# Patient Record
Sex: FEMALE | Race: BLACK OR AFRICAN AMERICAN | NOT HISPANIC OR LATINO | URBAN - NONMETROPOLITAN AREA
[De-identification: names, ages, dates, MRNs, and addresses within clinical notes are randomized per-mention and may not be internally consistent; named-entity substitution may affect disease eponyms.]

---
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
## 2023-03-08 ENCOUNTER — OFFICE VISIT (OUTPATIENT)
Dept: URBAN - NONMETROPOLITAN AREA CLINIC 2 | Facility: CLINIC | Age: 18
End: 2023-03-08

## 2023-03-08 RX ORDER — HYDROCORTISONE ACETATE 25 MG/1
1 SUPPOSITORY SUPPOSITORY RECTAL ONCE A DAY
Qty: 30 | Refills: 1 | Status: ACTIVE | COMMUNITY

## 2023-03-08 RX ORDER — PANTOPRAZOLE SODIUM 40 MG/1
1 TABLET TABLET, DELAYED RELEASE ORAL ONCE A DAY
Qty: 90 TABLET | Refills: 3 | Status: ACTIVE | COMMUNITY
Start: 2022-01-13

## 2023-03-08 RX ORDER — DICYCLOMINE HYDROCHLORIDE 10 MG/1
1 CAPSULES CAPSULE ORAL THREE TIMES A DAY
Qty: 90 CAPSULE | Refills: 3 | Status: ACTIVE | COMMUNITY

## 2023-04-05 ENCOUNTER — OFFICE VISIT (OUTPATIENT)
Dept: URBAN - NONMETROPOLITAN AREA CLINIC 2 | Facility: CLINIC | Age: 18
End: 2023-04-05

## 2024-05-01 ENCOUNTER — LAB OUTSIDE AN ENCOUNTER (OUTPATIENT)
Dept: URBAN - NONMETROPOLITAN AREA CLINIC 2 | Facility: CLINIC | Age: 19
End: 2024-05-01

## 2024-05-01 ENCOUNTER — DASHBOARD ENCOUNTERS (OUTPATIENT)
Age: 19
End: 2024-05-01

## 2024-05-01 ENCOUNTER — OFFICE VISIT (OUTPATIENT)
Dept: URBAN - NONMETROPOLITAN AREA CLINIC 2 | Facility: CLINIC | Age: 19
End: 2024-05-01
Payer: COMMERCIAL

## 2024-05-01 VITALS
HEIGHT: 65 IN | BODY MASS INDEX: 34.99 KG/M2 | SYSTOLIC BLOOD PRESSURE: 134 MMHG | TEMPERATURE: 98 F | HEART RATE: 85 BPM | WEIGHT: 210 LBS | DIASTOLIC BLOOD PRESSURE: 87 MMHG

## 2024-05-01 DIAGNOSIS — K51.211 ULCERATIVE PROCTITIS WITH RECTAL BLEEDING: ICD-10-CM

## 2024-05-01 PROCEDURE — 99214 OFFICE O/P EST MOD 30 MIN: CPT | Performed by: NURSE PRACTITIONER

## 2024-05-01 RX ORDER — PANTOPRAZOLE SODIUM 40 MG/1
1 TABLET TABLET, DELAYED RELEASE ORAL ONCE A DAY
Qty: 90 TABLET | Refills: 3 | Status: ACTIVE | COMMUNITY
Start: 2022-01-13

## 2024-05-01 RX ORDER — HYDROCORTISONE ACETATE 25 MG/1
1 SUPPOSITORY SUPPOSITORY RECTAL ONCE A DAY
Qty: 30 | Refills: 1 | Status: ACTIVE | COMMUNITY

## 2024-05-01 RX ORDER — DICYCLOMINE HYDROCHLORIDE 10 MG/1
1 CAPSULES CAPSULE ORAL THREE TIMES A DAY
Qty: 90 CAPSULE | Refills: 3 | Status: ACTIVE | COMMUNITY

## 2024-05-07 ENCOUNTER — CLAIMS CREATED FROM THE CLAIM WINDOW (OUTPATIENT)
Dept: URBAN - NONMETROPOLITAN AREA SURGERY CENTER 1 | Facility: SURGERY CENTER | Age: 19
End: 2024-05-07
Payer: COMMERCIAL

## 2024-05-07 ENCOUNTER — CLAIMS CREATED FROM THE CLAIM WINDOW (OUTPATIENT)
Dept: URBAN - METROPOLITAN AREA CLINIC 4 | Facility: CLINIC | Age: 19
End: 2024-05-07
Payer: COMMERCIAL

## 2024-05-07 DIAGNOSIS — K51.211 CHRONIC ULCERATIVE PROCTITIS WITH RECTAL BLEEDING: ICD-10-CM

## 2024-05-07 DIAGNOSIS — K62.89 OTHER SPECIFIED DISEASES OF ANUS AND RECTUM: ICD-10-CM

## 2024-05-07 DIAGNOSIS — K51.919 ULCERATIVE COLITIS, UNSPECIFIED WITH UNSPECIFIED COMPLICATIONS: ICD-10-CM

## 2024-05-07 DIAGNOSIS — K62.5 RECTAL HEMORRHAGE: ICD-10-CM

## 2024-05-07 PROCEDURE — 45331 SIGMOIDOSCOPY AND BIOPSY: CPT | Performed by: INTERNAL MEDICINE

## 2024-05-07 PROCEDURE — 00811 ANES LWR INTST NDSC NOS: CPT | Performed by: NURSE ANESTHETIST, CERTIFIED REGISTERED

## 2024-05-07 PROCEDURE — G8907 PT DOC NO EVENTS ON DISCHARG: HCPCS | Performed by: INTERNAL MEDICINE

## 2024-05-07 PROCEDURE — 88305 TISSUE EXAM BY PATHOLOGIST: CPT | Performed by: PATHOLOGY

## 2024-05-07 RX ORDER — HYDROCORTISONE ACETATE 25 MG/1
1 SUPPOSITORY SUPPOSITORY RECTAL ONCE A DAY
Qty: 30 | Refills: 1 | Status: ACTIVE | COMMUNITY

## 2024-05-07 RX ORDER — DICYCLOMINE HYDROCHLORIDE 10 MG/1
1 CAPSULES CAPSULE ORAL THREE TIMES A DAY
Qty: 90 CAPSULE | Refills: 3 | Status: ACTIVE | COMMUNITY

## 2024-05-07 RX ORDER — PANTOPRAZOLE SODIUM 40 MG/1
1 TABLET TABLET, DELAYED RELEASE ORAL ONCE A DAY
Qty: 90 TABLET | Refills: 3 | Status: ACTIVE | COMMUNITY
Start: 2022-01-13

## 2024-05-15 ENCOUNTER — TELEPHONE ENCOUNTER (OUTPATIENT)
Dept: URBAN - NONMETROPOLITAN AREA CLINIC 2 | Facility: CLINIC | Age: 19
End: 2024-05-15

## 2024-05-15 RX ORDER — MESALAMINE 375 MG/1
4 CAPSULES IN THE MORNING CAPSULE, EXTENDED RELEASE ORAL ONCE A DAY
Qty: 120 | Refills: 11 | OUTPATIENT
Start: 2024-05-15 | End: 2025-05-10

## 2024-05-15 RX ORDER — MESALAMINE 1000 MG/1
1 SUPPOSITORY AT BEDTIME SUPPOSITORY RECTAL ONCE A DAY
Qty: 30 | Refills: 3 | OUTPATIENT
Start: 2024-05-15 | End: 2024-09-12

## 2024-06-03 ENCOUNTER — TELEPHONE ENCOUNTER (OUTPATIENT)
Dept: URBAN - METROPOLITAN AREA CLINIC 80 | Facility: CLINIC | Age: 19
End: 2024-06-03

## 2024-06-03 RX ORDER — PREDNISONE 20 MG/1
40MG X7 DAYS, 30MGX 7 DAYS, 20MG X 7 DAYS, AND 10MG X7 DAYS AND STOP TABLET ORAL ONCE A DAY
Qty: 35 TABLET | Refills: 0 | OUTPATIENT
Start: 2024-06-03 | End: 2024-07-01

## 2024-06-13 ENCOUNTER — OFFICE VISIT (OUTPATIENT)
Dept: URBAN - NONMETROPOLITAN AREA CLINIC 2 | Facility: CLINIC | Age: 19
End: 2024-06-13
Payer: COMMERCIAL

## 2024-06-13 ENCOUNTER — OFFICE VISIT (OUTPATIENT)
Dept: URBAN - NONMETROPOLITAN AREA CLINIC 2 | Facility: CLINIC | Age: 19
End: 2024-06-13

## 2024-06-13 VITALS
BODY MASS INDEX: 35.06 KG/M2 | DIASTOLIC BLOOD PRESSURE: 86 MMHG | SYSTOLIC BLOOD PRESSURE: 136 MMHG | HEIGHT: 65 IN | WEIGHT: 210.4 LBS | HEART RATE: 88 BPM

## 2024-06-13 DIAGNOSIS — K51.211 ULCERATIVE PROCTITIS WITH RECTAL BLEEDING: ICD-10-CM

## 2024-06-13 PROCEDURE — 99214 OFFICE O/P EST MOD 30 MIN: CPT | Performed by: NURSE PRACTITIONER

## 2024-06-13 RX ORDER — PANTOPRAZOLE SODIUM 40 MG/1
1 TABLET TABLET, DELAYED RELEASE ORAL ONCE A DAY
Qty: 90 TABLET | Refills: 3 | Status: ACTIVE | COMMUNITY
Start: 2022-01-13

## 2024-06-13 RX ORDER — PREDNISONE 20 MG/1
40MG X7 DAYS, 30MGX 7 DAYS, 20MG X 7 DAYS, AND 10MG X7 DAYS AND STOP TABLET ORAL ONCE A DAY
Qty: 35 TABLET | Refills: 0 | Status: ACTIVE | COMMUNITY
Start: 2024-06-03 | End: 2024-07-01

## 2024-06-13 RX ORDER — MESALAMINE 1000 MG/1
1 SUPPOSITORY AT BEDTIME SUPPOSITORY RECTAL ONCE A DAY
Qty: 30 | Refills: 3 | Status: ACTIVE | COMMUNITY
Start: 2024-05-15 | End: 2024-09-12

## 2024-06-13 RX ORDER — HYDROCORTISONE ACETATE 25 MG/1
1 SUPPOSITORY SUPPOSITORY RECTAL ONCE A DAY
Qty: 30 | Refills: 1 | Status: ACTIVE | COMMUNITY

## 2024-06-13 RX ORDER — MESALAMINE 375 MG/1
4 CAPSULES IN THE MORNING CAPSULE, EXTENDED RELEASE ORAL ONCE A DAY
Qty: 120 | Refills: 11 | Status: ACTIVE | COMMUNITY
Start: 2024-05-15 | End: 2025-05-10

## 2024-06-13 RX ORDER — PREDNISONE 20 MG/1
40MG X7 DAYS, 30MGX 7 DAYS, 20MG X 7 DAYS, AND 10MG X7 DAYS AND STOP TABLET ORAL ONCE A DAY
Qty: 35 TABLET | Refills: 0 | OUTPATIENT
Start: 2024-06-13 | End: 2024-07-11

## 2024-06-13 RX ORDER — DICYCLOMINE HYDROCHLORIDE 10 MG/1
1 CAPSULES CAPSULE ORAL THREE TIMES A DAY
Qty: 90 CAPSULE | Refills: 3 | Status: ACTIVE | COMMUNITY

## 2024-06-13 NOTE — HPI-TODAY'S VISIT:
Ms. Alina Barakat is a pleasant 20 y/o F referred to GI clinic for evaluation of rectal bleeding. blood with BMs. c/o constipation, but going daily. to me, it sounds more like tenesmus. cramping with BM. hasn't started mesalamine due to cost. 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

## 2024-06-21 ENCOUNTER — TELEPHONE ENCOUNTER (OUTPATIENT)
Dept: URBAN - METROPOLITAN AREA CLINIC 80 | Facility: CLINIC | Age: 19
End: 2024-06-21

## 2024-06-21 RX ORDER — MESALAMINE 1000 MG/1
1 SUPPOSITORY AT BEDTIME SUPPOSITORY RECTAL ONCE A DAY
Qty: 30 | Refills: 3
Start: 2024-05-15 | End: 2024-10-22

## 2024-07-05 ENCOUNTER — TELEPHONE ENCOUNTER (OUTPATIENT)
Dept: URBAN - NONMETROPOLITAN AREA CLINIC 2 | Facility: CLINIC | Age: 19
End: 2024-07-05

## 2024-09-12 ENCOUNTER — OFFICE VISIT (OUTPATIENT)
Dept: URBAN - NONMETROPOLITAN AREA CLINIC 2 | Facility: CLINIC | Age: 19
End: 2024-09-12
Payer: COMMERCIAL

## 2024-09-12 VITALS — HEIGHT: 65 IN

## 2024-09-12 DIAGNOSIS — K51.211 ULCERATIVE PROCTITIS WITH RECTAL BLEEDING: ICD-10-CM

## 2024-09-12 PROCEDURE — 99214 OFFICE O/P EST MOD 30 MIN: CPT | Performed by: NURSE PRACTITIONER

## 2024-09-12 RX ORDER — MESALAMINE 375 MG/1
4 CAPSULES IN THE MORNING CAPSULE, EXTENDED RELEASE ORAL ONCE A DAY
Qty: 120 | Refills: 11 | Status: ACTIVE | COMMUNITY
Start: 2024-05-15 | End: 2025-05-10

## 2024-09-12 RX ORDER — PREDNISONE 10 MG/1
TAKE 4 TABS PO X 1 WEEK, TAKE 3 TABS PO X 1 WEEK, TAKE 2 TABS PO X 1 WEEK, TAKE 1 TAB PO X 1 WEEK TABLET ORAL ONCE A DAY
Qty: 70 | Refills: 0 | OUTPATIENT
Start: 2024-09-12 | End: 2024-10-12

## 2024-09-12 RX ORDER — MESALAMINE 1000 MG/1
1 SUPPOSITORY AT BEDTIME SUPPOSITORY RECTAL ONCE A DAY
Qty: 30 | Refills: 3 | Status: ACTIVE | COMMUNITY
Start: 2024-05-15 | End: 2024-10-22

## 2024-09-12 RX ORDER — DICYCLOMINE HYDROCHLORIDE 10 MG/1
1 CAPSULES CAPSULE ORAL THREE TIMES A DAY
Qty: 90 CAPSULE | Refills: 3 | Status: ACTIVE | COMMUNITY

## 2024-09-12 RX ORDER — HYDROCORTISONE ACETATE 25 MG/1
1 SUPPOSITORY SUPPOSITORY RECTAL ONCE A DAY
Qty: 30 | Refills: 1 | Status: ACTIVE | COMMUNITY

## 2024-09-12 RX ORDER — PANTOPRAZOLE SODIUM 40 MG/1
1 TABLET TABLET, DELAYED RELEASE ORAL ONCE A DAY
Qty: 90 TABLET | Refills: 3 | Status: ACTIVE | COMMUNITY
Start: 2022-01-13

## 2024-09-12 RX ORDER — ETRASIMOD 2 MG/1
1 TABLET TABLET, FILM COATED ORAL ONCE A DAY
Qty: 90 TABLET | Refills: 3 | OUTPATIENT
Start: 2024-09-12 | End: 2025-09-07

## 2024-09-12 NOTE — HPI-TODAY'S VISIT:
Ms. Alina Barakat is a pleasant 18 y/o F referred to GI clinic for evaluation of proctitis. Continues to have issues with bleeding and tenesmus unless she is on prednisone. reports she was compliant and took mesalamine 4 pills daily in am and did not see any improvement in complaints. 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

## 2024-10-03 ENCOUNTER — TELEPHONE ENCOUNTER (OUTPATIENT)
Dept: URBAN - NONMETROPOLITAN AREA CLINIC 2 | Facility: CLINIC | Age: 19
End: 2024-10-03

## 2024-10-03 RX ORDER — ETRASIMOD 2 MG/1
1 TABLET TABLET, FILM COATED ORAL ONCE A DAY
Qty: 30 | Refills: 11 | OUTPATIENT
Start: 2024-10-03 | End: 2025-09-28

## 2024-10-17 ENCOUNTER — TELEPHONE ENCOUNTER (OUTPATIENT)
Dept: URBAN - NONMETROPOLITAN AREA CLINIC 2 | Facility: CLINIC | Age: 19
End: 2024-10-17

## 2024-11-01 ENCOUNTER — TELEPHONE ENCOUNTER (OUTPATIENT)
Dept: URBAN - NONMETROPOLITAN AREA CLINIC 2 | Facility: CLINIC | Age: 19
End: 2024-11-01

## 2024-11-21 ENCOUNTER — TELEPHONE ENCOUNTER (OUTPATIENT)
Dept: URBAN - NONMETROPOLITAN AREA CLINIC 2 | Facility: CLINIC | Age: 19
End: 2024-11-21

## 2024-11-28 ENCOUNTER — TELEPHONE ENCOUNTER (OUTPATIENT)
Dept: URBAN - NONMETROPOLITAN AREA CLINIC 2 | Facility: CLINIC | Age: 19
End: 2024-11-28

## 2024-11-28 RX ORDER — PREDNISONE 10 MG/1
TAKE 4 TABS PO X 1 WEEK, TAKE 3 TABS PO X 1 WEEK, TAKE 2 TABS PO X 1 WEEK, TAKE 1 TAB PO X 1 WEEK TABLET ORAL ONCE A DAY
Qty: 70 | Refills: 0 | OUTPATIENT
Start: 2024-11-28 | End: 2024-12-28

## 2024-12-12 ENCOUNTER — TELEPHONE ENCOUNTER (OUTPATIENT)
Dept: URBAN - NONMETROPOLITAN AREA CLINIC 2 | Facility: CLINIC | Age: 19
End: 2024-12-12

## 2024-12-18 ENCOUNTER — OFFICE VISIT (OUTPATIENT)
Dept: URBAN - NONMETROPOLITAN AREA CLINIC 2 | Facility: CLINIC | Age: 19
End: 2024-12-18

## 2024-12-18 RX ORDER — HYDROCORTISONE ACETATE 25 MG/1
1 SUPPOSITORY SUPPOSITORY RECTAL ONCE A DAY
Qty: 30 | Refills: 1 | Status: ACTIVE | COMMUNITY

## 2024-12-18 RX ORDER — MESALAMINE 375 MG/1
4 CAPSULES IN THE MORNING CAPSULE, EXTENDED RELEASE ORAL ONCE A DAY
Qty: 120 | Refills: 11 | Status: ACTIVE | COMMUNITY
Start: 2024-05-15 | End: 2025-05-10

## 2024-12-18 RX ORDER — ETRASIMOD 2 MG/1
1 TABLET TABLET, FILM COATED ORAL ONCE A DAY
Qty: 30 | Refills: 11 | Status: ACTIVE | COMMUNITY
Start: 2024-10-03 | End: 2025-09-28

## 2024-12-18 RX ORDER — PREDNISONE 10 MG/1
TAKE 4 TABS PO X 1 WEEK, TAKE 3 TABS PO X 1 WEEK, TAKE 2 TABS PO X 1 WEEK, TAKE 1 TAB PO X 1 WEEK TABLET ORAL ONCE A DAY
Qty: 70 | Refills: 0 | Status: ACTIVE | COMMUNITY
Start: 2024-11-28 | End: 2024-12-28

## 2024-12-18 RX ORDER — ETRASIMOD 2 MG/1
1 TABLET TABLET, FILM COATED ORAL ONCE A DAY
Qty: 90 TABLET | Refills: 3 | Status: ACTIVE | COMMUNITY
Start: 2024-09-12 | End: 2025-09-07

## 2024-12-18 RX ORDER — ETRASIMOD 2 MG/1
1 TABLET TABLET, FILM COATED ORAL ONCE A DAY
Qty: 90 TABLET | Refills: 3 | OUTPATIENT

## 2024-12-18 RX ORDER — DICYCLOMINE HYDROCHLORIDE 10 MG/1
1 CAPSULES CAPSULE ORAL THREE TIMES A DAY
Qty: 90 CAPSULE | Refills: 3 | Status: ACTIVE | COMMUNITY

## 2024-12-18 RX ORDER — PANTOPRAZOLE SODIUM 40 MG/1
1 TABLET TABLET, DELAYED RELEASE ORAL ONCE A DAY
Qty: 90 TABLET | Refills: 3 | Status: ACTIVE | COMMUNITY
Start: 2022-01-13

## 2024-12-18 NOTE — HPI-TODAY'S VISIT:
12/18/24: Ms. Barakat returns to GI clinic for follow-up of ulcerative proctitis.  Since her last clinic visit, she was recommended to start Velsipity.  9/12/24: Ms. Alina Barakat is a pleasant 20 y/o F referred to GI clinic for evaluation of proctitis. Continues to have issues with bleeding and tenesmus unless she is on prednisone. reports she was compliant and took mesalamine 4 pills daily in am and did not see any improvement in complaints. 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

## 2025-01-09 ENCOUNTER — TELEPHONE ENCOUNTER (OUTPATIENT)
Dept: URBAN - NONMETROPOLITAN AREA CLINIC 2 | Facility: CLINIC | Age: 20
End: 2025-01-09

## 2025-01-24 ENCOUNTER — OFFICE VISIT (OUTPATIENT)
Dept: URBAN - NONMETROPOLITAN AREA CLINIC 2 | Facility: CLINIC | Age: 20
End: 2025-01-24
Payer: COMMERCIAL

## 2025-01-24 VITALS
BODY MASS INDEX: 35.89 KG/M2 | DIASTOLIC BLOOD PRESSURE: 85 MMHG | SYSTOLIC BLOOD PRESSURE: 126 MMHG | WEIGHT: 215.4 LBS | HEIGHT: 65 IN | HEART RATE: 75 BPM

## 2025-01-24 DIAGNOSIS — D84.9 IMMUNOSUPPRESSED STATUS: ICD-10-CM

## 2025-01-24 DIAGNOSIS — K51.211 ULCERATIVE PROCTITIS WITH RECTAL BLEEDING: ICD-10-CM

## 2025-01-24 PROBLEM — 38013005: Status: ACTIVE | Noted: 2025-01-24

## 2025-01-24 PROCEDURE — 99214 OFFICE O/P EST MOD 30 MIN: CPT | Performed by: INTERNAL MEDICINE

## 2025-01-24 RX ORDER — MESALAMINE 375 MG/1
4 CAPSULES IN THE MORNING CAPSULE, EXTENDED RELEASE ORAL ONCE A DAY
Qty: 120 | Refills: 11 | Status: DISCONTINUED | COMMUNITY
Start: 2024-05-15 | End: 2025-05-10

## 2025-01-24 RX ORDER — PANTOPRAZOLE SODIUM 40 MG/1
1 TABLET TABLET, DELAYED RELEASE ORAL ONCE A DAY
Qty: 90 TABLET | Refills: 3 | Status: ACTIVE | COMMUNITY
Start: 2022-01-13

## 2025-01-24 RX ORDER — DICYCLOMINE HYDROCHLORIDE 10 MG/1
1 CAPSULES CAPSULE ORAL THREE TIMES A DAY
Qty: 90 CAPSULE | Refills: 3 | Status: ACTIVE | COMMUNITY

## 2025-01-24 RX ORDER — HYDROCORTISONE ACETATE 25 MG/1
1 SUPPOSITORY SUPPOSITORY RECTAL ONCE A DAY
Qty: 30 | Refills: 1 | Status: ACTIVE | COMMUNITY

## 2025-01-24 RX ORDER — PREDNISONE 10 MG/1
TAKE 4 TABS PO X 1 WEEK, TAKE 3 TABS PO X 1 WEEK, TAKE 2 TABS PO X 1 WEEK, TAKE 1 TAB PO X 1 WEEK TABLET ORAL ONCE A DAY
Qty: 70 | Refills: 0 | OUTPATIENT
Start: 2025-01-24 | End: 2025-02-23

## 2025-01-24 RX ORDER — ETRASIMOD 2 MG/1
1 TABLET TABLET, FILM COATED ORAL ONCE A DAY
Qty: 30 | Refills: 11 | Status: ACTIVE | COMMUNITY
Start: 2024-10-03 | End: 2025-09-28

## 2025-01-24 RX ORDER — ETRASIMOD 2 MG/1
1 TABLET TABLET, FILM COATED ORAL ONCE A DAY
Qty: 90 TABLET | Refills: 3 | Status: ACTIVE | COMMUNITY

## 2025-01-24 RX ORDER — ETRASIMOD 2 MG/1
1 TABLET TABLET, FILM COATED ORAL ONCE A DAY
Qty: 90 TABLET | Refills: 3 | OUTPATIENT

## 2025-01-24 NOTE — HPI-TODAY'S VISIT:
1/24/25: Ms. Barakat returns to GI clinic for follow-up of ulcerative proctitis.  Since her last clinic visit, she was recommended to start Velsipity.  She has been on Velspity for a few months now.  She is still seeing blood in her stools and when she wipes.  She endorses "constipation" which could be tenesmus.  No diarrhea at this time.    9/12/24: Ms. Alina Barakat is a pleasant 18 y/o F referred to GI clinic for evaluation of proctitis. Continues to have issues with bleeding and tenesmus unless she is on prednisone. reports she was compliant and took mesalamine 4 pills daily in am and did not see any improvement in complaints. 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

## 2025-01-27 LAB
A/G RATIO: 1.3
ABSOLUTE BASOPHILS: 31
ABSOLUTE EOSINOPHILS: 61
ABSOLUTE LYMPHOCYTES: 1698
ABSOLUTE MONOCYTES: 546
ABSOLUTE NEUTROPHILS: 2764
ALBUMIN: 4.3
ALKALINE PHOSPHATASE: 87
ALT (SGPT): 14
AST (SGOT): 18
BASOPHILS: 0.6
BILIRUBIN, TOTAL: 0.8
BUN/CREATININE RATIO: (no result)
BUN: 9
C-REACTIVE PROTEIN, QUANT: <3
CALCIUM: 9.5
CARBON DIOXIDE, TOTAL: 24
CHLORIDE: 107
CREATININE: 0.67
EGFR: 127
EOSINOPHILS: 1.2
GLOBULIN, TOTAL: 3.2
GLUCOSE: 84
HEMATOCRIT: 40.9
HEMOGLOBIN: 13.9
LYMPHOCYTES: 33.3
MCH: 32
MCHC: 34
MCV: 94
MONOCYTES: 10.7
MPV: 10.1
NEUTROPHILS: 54.2
PLATELET COUNT: 355
POTASSIUM: 4.2
PROTEIN, TOTAL: 7.5
RDW: 12.7
RED BLOOD CELL COUNT: 4.35
SED RATE BY MODIFIED: 33
SODIUM: 140
WHITE BLOOD CELL COUNT: 5.1

## 2025-01-28 ENCOUNTER — WEB ENCOUNTER (OUTPATIENT)
Dept: URBAN - NONMETROPOLITAN AREA CLINIC 2 | Facility: CLINIC | Age: 20
End: 2025-01-28

## 2025-02-06 ENCOUNTER — TELEPHONE ENCOUNTER (OUTPATIENT)
Dept: URBAN - NONMETROPOLITAN AREA CLINIC 2 | Facility: CLINIC | Age: 20
End: 2025-02-06

## 2025-02-15 ENCOUNTER — WEB ENCOUNTER (OUTPATIENT)
Dept: URBAN - NONMETROPOLITAN AREA CLINIC 2 | Facility: CLINIC | Age: 20
End: 2025-02-15

## 2025-02-20 ENCOUNTER — LAB OUTSIDE AN ENCOUNTER (OUTPATIENT)
Dept: URBAN - NONMETROPOLITAN AREA CLINIC 2 | Facility: CLINIC | Age: 20
End: 2025-02-20

## 2025-02-25 LAB
HBSAG SCREEN: (no result)
HEP A AB, IGM: (no result)
HEP B CORE AB, IGM: (no result)
HEPATITIS C ANTIBODY: (no result)
MITOGEN-NIL: 7.64
QUANTIFERON NIL VALUE: 0.02
QUANTIFERON TB1 AG VALUE: 0
QUANTIFERON TB2 AG VALUE: 0
QUANTIFERON-TB GOLD PLUS: NEGATIVE

## 2025-02-27 ENCOUNTER — TELEPHONE ENCOUNTER (OUTPATIENT)
Dept: URBAN - NONMETROPOLITAN AREA CLINIC 2 | Facility: CLINIC | Age: 20
End: 2025-02-27

## 2025-03-12 ENCOUNTER — TELEPHONE ENCOUNTER (OUTPATIENT)
Dept: URBAN - NONMETROPOLITAN AREA CLINIC 2 | Facility: CLINIC | Age: 20
End: 2025-03-12

## 2025-03-17 ENCOUNTER — OFFICE VISIT (OUTPATIENT)
Dept: URBAN - METROPOLITAN AREA CLINIC 91 | Facility: CLINIC | Age: 20
End: 2025-03-17

## 2025-03-17 ENCOUNTER — TELEPHONE ENCOUNTER (OUTPATIENT)
Dept: URBAN - METROPOLITAN AREA CLINIC 91 | Facility: CLINIC | Age: 20
End: 2025-03-17

## 2025-03-20 ENCOUNTER — OFFICE VISIT (OUTPATIENT)
Dept: URBAN - METROPOLITAN AREA CLINIC 91 | Facility: CLINIC | Age: 20
End: 2025-03-20
Payer: COMMERCIAL

## 2025-03-20 VITALS
RESPIRATION RATE: 18 BRPM | HEIGHT: 65 IN | HEART RATE: 91 BPM | SYSTOLIC BLOOD PRESSURE: 131 MMHG | WEIGHT: 200 LBS | DIASTOLIC BLOOD PRESSURE: 98 MMHG | BODY MASS INDEX: 33.32 KG/M2 | TEMPERATURE: 97.8 F

## 2025-03-20 DIAGNOSIS — K51.211 ULCERATIVE PROCTITIS WITH RECTAL BLEEDING: ICD-10-CM

## 2025-03-20 PROCEDURE — 96413 CHEMO IV INFUSION 1 HR: CPT | Performed by: INTERNAL MEDICINE

## 2025-03-20 RX ORDER — PANTOPRAZOLE SODIUM 40 MG/1
1 TABLET TABLET, DELAYED RELEASE ORAL ONCE A DAY
Qty: 90 TABLET | Refills: 3 | Status: ACTIVE | COMMUNITY
Start: 2022-01-13

## 2025-03-20 RX ORDER — DICYCLOMINE HYDROCHLORIDE 10 MG/1
1 CAPSULES CAPSULE ORAL THREE TIMES A DAY
Qty: 90 CAPSULE | Refills: 3 | Status: ACTIVE | COMMUNITY

## 2025-03-20 RX ORDER — HYDROCORTISONE ACETATE 25 MG/1
1 SUPPOSITORY SUPPOSITORY RECTAL ONCE A DAY
Qty: 30 | Refills: 1 | Status: ACTIVE | COMMUNITY

## 2025-03-20 RX ORDER — ETRASIMOD 2 MG/1
1 TABLET TABLET, FILM COATED ORAL ONCE A DAY
Qty: 30 | Refills: 11 | Status: ACTIVE | COMMUNITY
Start: 2024-10-03 | End: 2025-09-28

## 2025-03-20 RX ORDER — ETRASIMOD 2 MG/1
1 TABLET TABLET, FILM COATED ORAL ONCE A DAY
Qty: 90 TABLET | Refills: 3 | Status: ACTIVE | COMMUNITY